# Patient Record
Sex: FEMALE
[De-identification: names, ages, dates, MRNs, and addresses within clinical notes are randomized per-mention and may not be internally consistent; named-entity substitution may affect disease eponyms.]

---

## 2024-02-29 PROBLEM — Z00.00 ENCOUNTER FOR PREVENTIVE HEALTH EXAMINATION: Status: ACTIVE | Noted: 2024-02-29

## 2024-03-05 ENCOUNTER — APPOINTMENT (OUTPATIENT)
Dept: OBGYN | Facility: CLINIC | Age: 19
End: 2024-03-05
Payer: MEDICAID

## 2024-03-05 VITALS
WEIGHT: 109 LBS | BODY MASS INDEX: 18.61 KG/M2 | SYSTOLIC BLOOD PRESSURE: 112 MMHG | HEIGHT: 64 IN | DIASTOLIC BLOOD PRESSURE: 78 MMHG

## 2024-03-05 DIAGNOSIS — Z01.419 ENCOUNTER FOR GYNECOLOGICAL EXAMINATION (GENERAL) (ROUTINE) W/OUT ABNORMAL FINDINGS: ICD-10-CM

## 2024-03-05 DIAGNOSIS — Z32.01 ENCOUNTER FOR PREGNANCY TEST, RESULT POSITIVE: ICD-10-CM

## 2024-03-05 LAB
BILIRUB UR QL STRIP: NORMAL
GLUCOSE UR-MCNC: NORMAL
HCG UR QL: 0.2 EU/DL
HCG UR QL: POSITIVE
HGB UR QL STRIP.AUTO: NORMAL
KETONES UR-MCNC: NORMAL
LEUKOCYTE ESTERASE UR QL STRIP: NORMAL
NITRITE UR QL STRIP: NORMAL
PH UR STRIP: 7.5
PROT UR STRIP-MCNC: NORMAL
SP GR UR STRIP: 1.01

## 2024-03-05 PROCEDURE — 81003 URINALYSIS AUTO W/O SCOPE: CPT | Mod: QW

## 2024-03-05 PROCEDURE — 76830 TRANSVAGINAL US NON-OB: CPT

## 2024-03-05 PROCEDURE — 81025 URINE PREGNANCY TEST: CPT

## 2024-03-05 PROCEDURE — 36415 COLL VENOUS BLD VENIPUNCTURE: CPT

## 2024-03-05 PROCEDURE — 99385 PREV VISIT NEW AGE 18-39: CPT

## 2024-03-05 PROCEDURE — 99202 OFFICE O/P NEW SF 15 MIN: CPT | Mod: 25

## 2024-03-06 LAB
BASOPHILS # BLD AUTO: 0.03 K/UL
BASOPHILS NFR BLD AUTO: 0.4 %
EOSINOPHIL # BLD AUTO: 0.03 K/UL
EOSINOPHIL NFR BLD AUTO: 0.4 %
HCG SERPL-MCNC: 571 MIU/ML
HCT VFR BLD CALC: 39.7 %
HGB BLD-MCNC: 13 G/DL
IMM GRANULOCYTES NFR BLD AUTO: 0.3 %
LYMPHOCYTES # BLD AUTO: 2.01 K/UL
LYMPHOCYTES NFR BLD AUTO: 25.6 %
MAN DIFF?: NORMAL
MCHC RBC-ENTMCNC: 30 PG
MCHC RBC-ENTMCNC: 32.7 GM/DL
MCV RBC AUTO: 91.5 FL
MONOCYTES # BLD AUTO: 0.51 K/UL
MONOCYTES NFR BLD AUTO: 6.5 %
NEUTROPHILS # BLD AUTO: 5.26 K/UL
NEUTROPHILS NFR BLD AUTO: 66.8 %
PLATELET # BLD AUTO: 280 K/UL
RBC # BLD: 4.34 M/UL
RBC # FLD: 12.2 %
WBC # FLD AUTO: 7.86 K/UL

## 2024-03-08 LAB
ABO + RH PNL BLD: NORMAL
C TRACH RRNA SPEC QL NAA+PROBE: NOT DETECTED
N GONORRHOEA RRNA SPEC QL NAA+PROBE: NOT DETECTED
SOURCE TP AMPLIFICATION: NORMAL

## 2024-03-10 NOTE — PROCEDURE
[Threatened Miscarriage] : threatened miscarriage [Transvaginal Ultrasound] : transvaginal ultrasound [Retroverted] : retroverted [Cervical Pap Smear] : cervical Pap smear [Tolerated Well] : the patient tolerated the procedure well [Liquid Base] : liquid base [GC & Chlamydia via Pap] : GC & Chlamydia via Pap [FreeTextEntry7] : 3.7 x 2.5 cm  [No Complications] : there were no complications [FreeTextEntry5] : No IUG seen , EE 5.6 -8.6 mm, no retained POC or tissue noted.  [FreeTextEntry8] : 3.7 x1.7 cm  [FreeTextEntry4] : Completed

## 2024-03-10 NOTE — PHYSICAL EXAM
[Chaperone Present] : A chaperone was present in the examining room during all aspects of the physical examination [Appropriately responsive] : appropriately responsive [Alert] : alert [No Lymphadenopathy] : no lymphadenopathy [No Acute Distress] : no acute distress [Non-tender] : non-tender [Soft] : soft [No Lesions] : no lesions [No HSM] : No HSM [Non-distended] : non-distended [No Mass] : no mass [Oriented x3] : oriented x3 [No Masses] : no breast masses were palpable [Examination Of The Breasts] : a normal appearance [Labia Minora] : normal [Labia Majora] : normal [Normal] : normal [Uterine Adnexae] : normal

## 2024-03-10 NOTE — PLAN
[FreeTextEntry1] : Completed  , otherwise normal exam.  -Reviewed miscarriage precautions and birth control options  -Pap done -Bse rev -Blood type sent , Cbc sent  -rv prn or 1yr

## 2024-03-10 NOTE — COUNSELING
[Vitamins/Supplements] : vitamins/supplements [Nutrition/ Exercise/ Weight Management] : nutrition, exercise, weight management [Contraception/ Emergency Contraception/ Safe Sexual Practices] : contraception, emergency contraception, safe sexual practices [Breast Self Exam] : breast self exam

## 2024-03-10 NOTE — HISTORY OF PRESENT ILLNESS
[FreeTextEntry1] : Pt is a  20 y/o LMP 1/19/24, Here for annual exam and  to  f/u visit for bleeding seen in ER . Bleeding got heavier after ER visit   Results from ER: Small sac seen on sono 3/3  0.8 cm mean diameter + yolk sac seen , no pole.  beta was [id number]

## 2024-03-11 LAB — CYTOLOGY CVX/VAG DOC THIN PREP: NORMAL
